# Patient Record
Sex: MALE | Race: BLACK OR AFRICAN AMERICAN | NOT HISPANIC OR LATINO | URBAN - METROPOLITAN AREA
[De-identification: names, ages, dates, MRNs, and addresses within clinical notes are randomized per-mention and may not be internally consistent; named-entity substitution may affect disease eponyms.]

---

## 2018-03-24 ENCOUNTER — EMERGENCY (EMERGENCY)
Facility: HOSPITAL | Age: 38
LOS: 1 days | Discharge: HOME | End: 2018-03-24
Admitting: EMERGENCY MEDICINE
Payer: SELF-PAY

## 2018-03-24 VITALS
DIASTOLIC BLOOD PRESSURE: 83 MMHG | OXYGEN SATURATION: 99 % | TEMPERATURE: 98 F | HEART RATE: 95 BPM | RESPIRATION RATE: 18 BRPM | SYSTOLIC BLOOD PRESSURE: 137 MMHG

## 2018-03-24 DIAGNOSIS — S00.11XA CONTUSION OF RIGHT EYELID AND PERIOCULAR AREA, INITIAL ENCOUNTER: ICD-10-CM
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
## 2018-03-24 DIAGNOSIS — S69.92XA UNSPECIFIED INJURY OF LEFT WRIST, HAND AND FINGER(S), INITIAL ENCOUNTER: ICD-10-CM
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
## 2018-03-24 DIAGNOSIS — S01.411A LACERATION WITHOUT FOREIGN BODY OF RIGHT CHEEK AND TEMPOROMANDIBULAR AREA, INITIAL ENCOUNTER: ICD-10-CM

## 2018-03-24 DIAGNOSIS — S62.515A NONDISPLACED FRACTURE OF PROXIMAL PHALANX OF LEFT THUMB, INITIAL ENCOUNTER FOR CLOSED FRACTURE: ICD-10-CM

## 2018-03-24 DIAGNOSIS — Z23 ENCOUNTER FOR IMMUNIZATION: ICD-10-CM
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
## 2018-03-24 PROCEDURE — 99053 MED SERV 10PM-8AM 24 HR FAC: CPT

## 2018-03-24 PROCEDURE — 29125 APPL SHORT ARM SPLINT STATIC: CPT

## 2018-03-24 PROCEDURE — 12011 RPR F/E/E/N/L/M 2.5 CM/<: CPT

## 2018-03-24 PROCEDURE — 99284 EMERGENCY DEPT VISIT MOD MDM: CPT | Mod: 25

## 2018-03-25 VITALS — WEIGHT: 177.91 LBS | HEIGHT: 74 IN

## 2018-03-25 PROCEDURE — 73130 X-RAY EXAM OF HAND: CPT | Mod: 26,LT

## 2018-03-25 PROCEDURE — 70450 CT HEAD/BRAIN W/O DYE: CPT

## 2018-03-25 PROCEDURE — 90471 IMMUNIZATION ADMIN: CPT

## 2018-03-25 PROCEDURE — 12011 RPR F/E/E/N/L/M 2.5 CM/<: CPT

## 2018-03-25 PROCEDURE — 99284 EMERGENCY DEPT VISIT MOD MDM: CPT | Mod: 25

## 2018-03-25 PROCEDURE — 29125 APPL SHORT ARM SPLINT STATIC: CPT

## 2018-03-25 PROCEDURE — 90715 TDAP VACCINE 7 YRS/> IM: CPT

## 2018-03-25 PROCEDURE — 70486 CT MAXILLOFACIAL W/O DYE: CPT

## 2018-03-25 PROCEDURE — 70486 CT MAXILLOFACIAL W/O DYE: CPT | Mod: 26

## 2018-03-25 PROCEDURE — 70450 CT HEAD/BRAIN W/O DYE: CPT | Mod: 26

## 2018-03-25 PROCEDURE — 73130 X-RAY EXAM OF HAND: CPT

## 2018-03-25 RX ORDER — TETANUS TOXOID, REDUCED DIPHTHERIA TOXOID AND ACELLULAR PERTUSSIS VACCINE, ADSORBED 5; 2.5; 8; 8; 2.5 [IU]/.5ML; [IU]/.5ML; UG/.5ML; UG/.5ML; UG/.5ML
0.5 SUSPENSION INTRAMUSCULAR ONCE
Qty: 0 | Refills: 0 | Status: COMPLETED | OUTPATIENT
Start: 2018-03-25 | End: 2018-03-25

## 2018-03-25 RX ADMIN — TETANUS TOXOID, REDUCED DIPHTHERIA TOXOID AND ACELLULAR PERTUSSIS VACCINE, ADSORBED 0.5 MILLILITER(S): 5; 2.5; 8; 8; 2.5 SUSPENSION INTRAMUSCULAR at 00:38

## 2018-03-25 NOTE — ED PROCEDURE NOTE - NS ED PERI VASCULAR NEG
no cyanosis of extremity/fingers/toes warm to touch/no swelling/capillary refill time < 2 seconds/no paresthesia

## 2018-03-25 NOTE — ED PROVIDER NOTE - PHYSICAL EXAMINATION
GEN: WD/WN, NAD  HEAD: NC/AT; no periorbital ecchymosis or Martinez's sign. 1.5 cm lac to right cheek.  right periorbital ecchymosis and moderate swelling.  NEURO: Alert, oriented. CN II-XII intact. Clear speech.  SILT all ext. Motor 5/5 all ext. F-N intact.  Gait steady.   EYE: PERRL, EOMI.   ENT: Airway patent.  No dental injury. No epistaxis or rhinorrhea. No otorrhea or hemotympanum.  PULM: No resp distress. Lungs CTA bilat.  CV: RRR, S1S2  GI: Abdomen soft, nontender  MSK: Neck with painless ROM; no midline neck tenderness.  Extremities: left thumb tender over MCP and IP joint.  Normal sensation. Cap refill < 2 sec.   No wrist/snuffbox tenderness.  SKIN: Intact.  Normal color and turgor.
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
## 2018-03-25 NOTE — ED PROCEDURE NOTE - CPROC ED POST PROC CARE GUIDE1
Instructed patient/caregiver regarding signs and symptoms of infection./Elevate the injured extremity as instructed./Keep the cast/splint/dressing clean and dry./Instructed patient/caregiver to follow-up with primary care physician./Verbal/written post procedure instructions were given to patient/caregiver.
Instructed patient/caregiver to follow-up with primary care physician./Verbal/written post procedure instructions were given to patient/caregiver./bacitracin applied/Instructed patient/caregiver regarding signs and symptoms of infection.

## 2018-03-25 NOTE — ED PROVIDER NOTE - NS ED ROS FT
CONSTITUTIONAL: No fever, chills, or weakness  NEURO: No headache, no dizziness, no syncope; No weakness/tingling/numbness  EYES: No visual changes  ENT: No rhinorrhea or sore throat  PULM: No cough or dyspnea  CV: No chest pain or palpitations  GI: No abdominal pain, vomiting, or diarrhea  : No dysuria, hematuria, frequency  MSK: No neck pain or back pain, left thumb pain only, no other MSK pain  SKIN: facial lac
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
## 2022-11-09 NOTE — ED ADULT NURSE NOTE - NSSISCREENINGQ5_ED_A_ED
APC Attestation      I personally saw and evaluated the patient. I discussed the management with the APC and reviewed the APC's note and agree with the documentation. I performed the substantive portion of the History, Physical Exam, and Medical decision making as documented by the APC.     Pt is [place] speaking. History was obtained with the assistance of a .    HISTORY:   2022  6:28 AM Bryn Mcclendon is a 61 year old male who presents to the ED for evaluation of right lower extremity pain that began 2 days ago. Pt reports right lower extremity swelling and pain for the past few days. He states his pain is located below his knee. The patient denies ankle pain or fevers.  Denies any falls or injuries. There are no further complaints or modifying factors at this time.    PCP: Angelo Christiansen MD      and Physical Exam  Vitals and nursing note reviewed.   Constitutional:       General: He is not in acute distress.     Appearance: He is well-developed. He is not diaphoretic.      Comments: Resting in bed with no discomfort   HENT:      Head: Normocephalic and atraumatic.   Eyes:      Pupils: Pupils are equal, round, and reactive to light.   Cardiovascular:      Rate and Rhythm: Normal rate and regular rhythm.      Pulses: Normal pulses.           Dorsalis pedis pulses are 2+ on the right side.      Heart sounds: Normal heart sounds. No murmur heard.  Pulmonary:      Effort: Pulmonary effort is normal. No respiratory distress.      Breath sounds: Normal breath sounds and air entry. No wheezing.   Abdominal:      General: There is distension (mild for fluid wave).      Palpations: Abdomen is soft.      Tenderness: There is no abdominal tenderness. There is no guarding or rebound.   Musculoskeletal:      Cervical back: Normal range of motion.      Right knee: No crepitus. Normal range of motion (without pain).      Right lower le+ Edema (below right knee, including foot) present.       Right ankle: Tenderness (diffuse, throughout to palpation of lower aspect of leg) present. Normal range of motion (without pain).      Right foot: No crepitus.      Comments: Compartments are soft and compressible   Feet:      Comments: Chronic appearing wound to the right toe  Skin:     General: Skin is warm and dry.      Findings: Wound (healed, on medially aspect of calf) present. No erythema.   Neurological:      Mental Status: He is alert and oriented to person, place, and time.      Cranial Nerves: No cranial nerve deficit.         ED Course as of 11/09/22 2110 Wed Nov 09, 2022   0744 Potassium elevated at 5.2 no EKG changes and troponin normal. [KR]   0745 Patient states he has not taken any of his medications this morning. [KR]   0745 Hemoglobin lower than his baseline at 7.7 and sodium low at 130 suspect dilution due to fluid overload [KR]   0746 XR tib fib: No acute fracture or dislocation. There is diffuse soft tissue swelling,  nonspecific. Correlation with clinical history and physical examination is  recommended.     Partially visualized right knee negative for acute process. Probable  vascular calcification projecting along the inferior aspect of the patella. [KR]   0924 US DVT: No ultrasound evidence of deep venous thrombosis in the right lower  extremity. [KR]   0956 Discussed pt with a GUZMAN on liver transplant. They do not see any emergent concerns from a hepatology stand point. They agree with admission at [facility] given the complexity of his history [KR]   1011 Discussed pt admission with hospitalist. Discussed possible cellulitis and pts hx of cirrhosis and CKD. They agree with admission. They agree with starting vanco. Admit to: Dr. Orozco [KR]      ED Course User Index  [KR] Leanne West PA-C      Vitals  Vitals:    11/09/22 0559   BP: (!) 158/86   BP Location: LUE - Left upper extremity   Patient Position: Sitting/High-Chaves's   Pulse: 97   Resp: 18   Temp: 98 °F (36.7 °C)    TempSrc: Oral   SpO2: 100%       ED Course    6:32 AM I evaluated the pt and introduced myself as the attending. I discussed the plan for blood work with culture, US lower extremity, and XR tibia fibula as well as pain control. Pt and he agrees.     MDM  Patient presents with leg pain, swelling, erythema concerning for cellulitis. Agree with plan for US to r/o DVT. No evidence of compartment syndrome, necrotizing infection or neurovascular compromise.     Critical Care    No Critical Care          I have reviewed the information recorded by the scribe for accuracy and agree with its contents.    ____________________________________________________________________    Minoo Marsh acting as a scribe for Dr. Rosario Gaines  Dictation # [id number]  Scribe: Minoo Gaines MD  11/09/22 7242     No

## 2023-05-28 NOTE — ED ADULT NURSE NOTE - CADM POA CENTRAL LINE
Physical Therapy    Visit Type: initial evaluation  Co-treat with: Occupational therapist    Relevant History/Co-morbidities: hx of agoraphobia    SUBJECTIVE  Patient agreed to participate in therapy this date.  Patient / Family Goal: maximize function and return home    Pain   Patient reports pain is not an issue/concern.     OBJECTIVE     Cognitive Status   Orientation    - Oriented to: person, place, time and situation  Functional Communication   - Overall Status: within functional limits (slight decrease in respond time)     Strength  (out of 5 unless noted, standard test position unless noted)   Comments / Details: Grossly 3/5 throughout bilateral LEs      Sitting Balance  (JACQUELINE = base of support)  Static      - Trial 1 details: supervision and with back unsupported  Dynamic      - Trial 1 details: minimal assist and with back unsupported       Bed Mobility  - Supine to sit: moderate assist, 2 persons (for trunk advancement and advancement to EOB)  Transfers  Assistive devices: gait belt, 2-wheeled walker, 1 person  - Sit to stand: minimal assist (VC's for safe hand placement and anterior weight shift)  - Stand to sit: minimal assist (cueing for hand placement)    Ambulation / Gait  - Assistive device: gait belt, two-wheeled walker and 1 person  - Distance (feet unless otherwise indicated): 15  - Assist Level: contact guard/touching/steadying assist (mod cueing for walker management)  - Surface: even  - Description: decreased osman/pace and step through (ambulates on right side of walker)             Education:   - Present and ready to learn: patient  Education provided during session:  - Results of above outlined education: Needs reinforcement and Verbalizes understanding    ASSESSMENT   Patient will benefit from skilled therapy to address listed impairments and functional limitations.  Interferring components: medical status limitations and decreased activity tolerance    Summary of function and discharge  needs based on today's assessment:   - Current level of function: significantly below baseline level of function   - Therapy needs at discharge: therapy 5 or more times per week   - Activities of daily living (ADLs) requiring support at discharge: bed mobility, transfers, ambulation and stairs   - Impairments that require further therapy intervention: activity tolerance, strength, balance and safety awareness    AM-PAC  - Generalized Prior Level of Function: IND/MOD I ([facility] 22-24)       Key: MOD A=moderate assistance, IND/MOD I=independent/modified independent  - Generalized Current Level of Function     - Current Mobility Score: 13       AM-PAC Scoring Key= >21 Modified Independent; 20-21 Supervision; 18-19 Minimal assist; 13-18 Moderate assist; 9-12 Max assist; <9 Total assist       • Predicted patient presentation: Moderate (evolving) - Patient comorbidities and complexities, as defined above, may have varying impact on steady progress for prescribed plan of care.    PLAN   Suggestions for next session as indicated: Progress mobility as able  PT Frequency: 3-5 x per week         Interventions: bed mobility, balance, gait training, strengthening, HEP train/position, patient/family training, safety education, functional transfer training and stairs retraining  Agreement to plan and goals: patient agrees with goals and treatment plan        GOALS  Review Date: 6/3/2023  Long Term Goals: (to be met by time of discharge from hospital)  Sit to supine: Patient will complete sit to supine modified independent.  Supine to sit: Patient will complete supine to sit modified independent.  Sit to stand: Patient will complete sit to stand transfer with least restrictive device, modified independent.   Stand to sit: Patient will complete stand to sit transfer with least restrictive device, modified independent.   Ambulation (even): Patient will ambulate on even surface for 150 feet with least restrictive device, modified  independent.   Stairs: Patient will ambulate 5 steps with least restrictive device using one rail, modified independent.     Documented in the chart in the following areas: Assessment/Plan.      Patient at End of Session:   Location: in chair  Safety measures: alarm system in place/re-engaged, call light within reach and lines intact  Handoff to: nurse (Morenita ROSALES)      Therapy procedure time and total treatment time can be found documented on the Time Entry flowsheet   No